# Patient Record
Sex: MALE | Race: WHITE | NOT HISPANIC OR LATINO | URBAN - METROPOLITAN AREA
[De-identification: names, ages, dates, MRNs, and addresses within clinical notes are randomized per-mention and may not be internally consistent; named-entity substitution may affect disease eponyms.]

---

## 2017-11-25 ENCOUNTER — EMERGENCY (EMERGENCY)
Facility: HOSPITAL | Age: 22
LOS: 1 days | Discharge: HOME | End: 2017-11-25
Attending: EMERGENCY MEDICINE
Payer: COMMERCIAL

## 2017-11-25 VITALS
SYSTOLIC BLOOD PRESSURE: 114 MMHG | TEMPERATURE: 98 F | OXYGEN SATURATION: 98 % | RESPIRATION RATE: 18 BRPM | HEART RATE: 76 BPM | DIASTOLIC BLOOD PRESSURE: 74 MMHG | HEIGHT: 70 IN | WEIGHT: 179.9 LBS

## 2017-11-25 LAB
ALBUMIN SERPL ELPH-MCNC: 4.7 G/DL — SIGNIFICANT CHANGE UP (ref 3.3–5.2)
ALP SERPL-CCNC: 52 U/L — SIGNIFICANT CHANGE UP (ref 40–120)
ALT FLD-CCNC: 12 U/L — SIGNIFICANT CHANGE UP
ANION GAP SERPL CALC-SCNC: 11 MMOL/L — SIGNIFICANT CHANGE UP (ref 5–17)
AST SERPL-CCNC: 15 U/L — SIGNIFICANT CHANGE UP
BASOPHILS # BLD AUTO: 0 K/UL — SIGNIFICANT CHANGE UP (ref 0–0.2)
BASOPHILS NFR BLD AUTO: 0.3 % — SIGNIFICANT CHANGE UP (ref 0–2)
BILIRUB SERPL-MCNC: 0.3 MG/DL — LOW (ref 0.4–2)
BUN SERPL-MCNC: 14 MG/DL — SIGNIFICANT CHANGE UP (ref 8–20)
CALCIUM SERPL-MCNC: 9.9 MG/DL — SIGNIFICANT CHANGE UP (ref 8.6–10.2)
CHLORIDE SERPL-SCNC: 99 MMOL/L — SIGNIFICANT CHANGE UP (ref 98–107)
CO2 SERPL-SCNC: 29 MMOL/L — SIGNIFICANT CHANGE UP (ref 22–29)
CREAT SERPL-MCNC: 0.94 MG/DL — SIGNIFICANT CHANGE UP (ref 0.5–1.3)
EOSINOPHIL # BLD AUTO: 0.2 K/UL — SIGNIFICANT CHANGE UP (ref 0–0.5)
EOSINOPHIL NFR BLD AUTO: 3.4 % — SIGNIFICANT CHANGE UP (ref 0–6)
GLUCOSE SERPL-MCNC: 92 MG/DL — SIGNIFICANT CHANGE UP (ref 70–115)
HCT VFR BLD CALC: 41.7 % — LOW (ref 42–52)
HGB BLD-MCNC: 14.4 G/DL — SIGNIFICANT CHANGE UP (ref 14–18)
LYMPHOCYTES # BLD AUTO: 2 K/UL — SIGNIFICANT CHANGE UP (ref 1–4.8)
LYMPHOCYTES # BLD AUTO: 30.7 % — SIGNIFICANT CHANGE UP (ref 20–55)
MCHC RBC-ENTMCNC: 31.3 PG — HIGH (ref 27–31)
MCHC RBC-ENTMCNC: 34.5 G/DL — SIGNIFICANT CHANGE UP (ref 32–36)
MCV RBC AUTO: 90.7 FL — SIGNIFICANT CHANGE UP (ref 80–94)
MONOCYTES # BLD AUTO: 0.7 K/UL — SIGNIFICANT CHANGE UP (ref 0–0.8)
MONOCYTES NFR BLD AUTO: 10.6 % — HIGH (ref 3–10)
NEUTROPHILS # BLD AUTO: 3.6 K/UL — SIGNIFICANT CHANGE UP (ref 1.8–8)
NEUTROPHILS NFR BLD AUTO: 54.9 % — SIGNIFICANT CHANGE UP (ref 37–73)
PLATELET # BLD AUTO: 285 K/UL — SIGNIFICANT CHANGE UP (ref 150–400)
POTASSIUM SERPL-MCNC: 4.1 MMOL/L — SIGNIFICANT CHANGE UP (ref 3.5–5.3)
POTASSIUM SERPL-SCNC: 4.1 MMOL/L — SIGNIFICANT CHANGE UP (ref 3.5–5.3)
PROT SERPL-MCNC: 7.5 G/DL — SIGNIFICANT CHANGE UP (ref 6.6–8.7)
RBC # BLD: 4.6 M/UL — SIGNIFICANT CHANGE UP (ref 4.6–6.2)
RBC # FLD: 12.9 % — SIGNIFICANT CHANGE UP (ref 11–15.6)
SODIUM SERPL-SCNC: 139 MMOL/L — SIGNIFICANT CHANGE UP (ref 135–145)
WBC # BLD: 6.7 K/UL — SIGNIFICANT CHANGE UP (ref 4.8–10.8)
WBC # FLD AUTO: 6.7 K/UL — SIGNIFICANT CHANGE UP (ref 4.8–10.8)

## 2017-11-25 PROCEDURE — 99284 EMERGENCY DEPT VISIT MOD MDM: CPT | Mod: 25

## 2017-11-25 PROCEDURE — 74177 CT ABD & PELVIS W/CONTRAST: CPT | Mod: 26

## 2017-11-25 PROCEDURE — 99284 EMERGENCY DEPT VISIT MOD MDM: CPT

## 2017-11-25 PROCEDURE — 80053 COMPREHEN METABOLIC PANEL: CPT

## 2017-11-25 PROCEDURE — 96374 THER/PROPH/DIAG INJ IV PUSH: CPT

## 2017-11-25 PROCEDURE — 36415 COLL VENOUS BLD VENIPUNCTURE: CPT

## 2017-11-25 PROCEDURE — 74177 CT ABD & PELVIS W/CONTRAST: CPT

## 2017-11-25 PROCEDURE — 85027 COMPLETE CBC AUTOMATED: CPT

## 2017-11-25 RX ORDER — SODIUM CHLORIDE 9 MG/ML
3 INJECTION INTRAMUSCULAR; INTRAVENOUS; SUBCUTANEOUS EVERY 8 HOURS
Qty: 0 | Refills: 0 | Status: DISCONTINUED | OUTPATIENT
Start: 2017-11-25 | End: 2017-11-29

## 2017-11-25 RX ORDER — FAMOTIDINE 10 MG/ML
20 INJECTION INTRAVENOUS ONCE
Qty: 0 | Refills: 0 | Status: COMPLETED | OUTPATIENT
Start: 2017-11-25 | End: 2017-11-25

## 2017-11-25 RX ORDER — SODIUM CHLORIDE 9 MG/ML
1000 INJECTION INTRAMUSCULAR; INTRAVENOUS; SUBCUTANEOUS ONCE
Qty: 0 | Refills: 0 | Status: COMPLETED | OUTPATIENT
Start: 2017-11-25 | End: 2017-11-25

## 2017-11-25 RX ADMIN — SODIUM CHLORIDE 3 MILLILITER(S): 9 INJECTION INTRAMUSCULAR; INTRAVENOUS; SUBCUTANEOUS at 19:18

## 2017-11-25 RX ADMIN — SODIUM CHLORIDE 2000 MILLILITER(S): 9 INJECTION INTRAMUSCULAR; INTRAVENOUS; SUBCUTANEOUS at 19:18

## 2017-11-25 RX ADMIN — FAMOTIDINE 20 MILLIGRAM(S): 10 INJECTION INTRAVENOUS at 19:18

## 2017-11-25 NOTE — ED STATDOCS - [facility] ED SYMPTOM NEG
no chills, no hematemesis, no bloody stools, no CP, no SOB, no difficulty urinating, no rashes, no swelling/no fever

## 2017-11-25 NOTE — ED STATDOCS - PROGRESS NOTE DETAILS
PA NOTE: Pt seen by intake physician and orders/plan reviewed. PT presenting to ED with complaints of abdominal pain.  Pt states symptoms started about 4 days ago, described as diffuse sharp and hunger like pains.  Pt states similar symptoms in the past generally relieved when eating, but not this time.  Pt states positive nausea with non-bloody non-bilious vomiting.  Pt states loose watery non-bloody stools.  Pt denies fever, chills, recent travel/antibiotic use, hx abd sx, headache, dizziness, dysuria, urinary frequency/urgency.  PE: GEN: Awake, alert,  NAD,  EYES: PERRL CARDIAC: Reg rate and rhythm, S1,S2, RRR  RESP: No distress noted. Lungs CTA bilaterally no wheeze, ronchi, rales. ABD: soft, supple, diffuse tenderness, no guarding. . NEURO: AOx3, no focal deficits   PLAN: labs as ordered; abd/pelvic CT; IV fluids for hydration CT negative. Patient feels better. Discussed plan for discharge with PA.

## 2017-11-25 NOTE — ED STATDOCS - PLAN OF CARE
Follow up with your primary care doctor in 1 week.  Eat small light meals as tolerated and be sure to drink plenty of fluids to stay well hydrated.

## 2017-11-25 NOTE — ED STATDOCS - MEDICAL DECISION MAKING DETAILS
21 y/o M with 4 days abdominal pain, vomiting, and diarrhaea - diffusely tender, appears mildly uncomfortable: Will do labs and CT fo further evaluate for colitis. 21 y/o M with 4 days abdominal pain, vomiting, and diarrhaea - diffusely tender, appears mildly uncomfortable: Will do labs and CT fo further evaluate for colitis. CT negative and patient feels better. Will discharge with f/u to PMD.

## 2017-11-25 NOTE — ED STATDOCS - OBJECTIVE STATEMENT
21 y/o M presents to the ED c/o sharp intermittent abdominal pain which onset 4 days ago. He notes of vomiting and diarrhea . Pt does not take medication for acid reflux. He usually has this pain when he is hungry but goes away when he eats.  He notes he has had no weight loss or any recent abx use. Pt does not smoke but drinks occasionally. NKDA. He denies fevers, hematemesis, bloody stools, fevers, chills, CP, SOB, difficulty urinating, rashes, or swelling. No further complaints at this time.

## 2017-11-25 NOTE — ED STATDOCS - CARE PLAN
Principal Discharge DX:	Abdominal pain, unspecified abdominal location  Secondary Diagnosis:	Vomiting and diarrhea Principal Discharge DX:	Abdominal pain, unspecified abdominal location  Instructions for follow-up, activity and diet:	Follow up with your primary care doctor in 1 week.  Eat small light meals as tolerated and be sure to drink plenty of fluids to stay well hydrated.  Secondary Diagnosis:	Vomiting and diarrhea

## 2019-08-23 NOTE — ED STATDOCS - ATTENDING CONTRIBUTION TO CARE
Attending
I, Catherine Cook, performed the initial face to face bedside interview with this patient regarding history of present illness, review of symptoms and relevant past medical, social and family history.  I completed an independent physical examination.  I was the initial provider who evaluated this patient. I have signed out the follow up of any pending tests (i.e. labs, radiological studies) to the ACP.  I have communicated the patient’s plan of care and disposition with the ACP.

## 2023-09-29 NOTE — ED ADULT NURSE NOTE - PRO INTERPRETER NEED 2
